# Patient Record
Sex: FEMALE | Race: WHITE | NOT HISPANIC OR LATINO
[De-identification: names, ages, dates, MRNs, and addresses within clinical notes are randomized per-mention and may not be internally consistent; named-entity substitution may affect disease eponyms.]

---

## 2021-12-27 ENCOUNTER — NON-APPOINTMENT (OUTPATIENT)
Age: 26
End: 2021-12-27

## 2021-12-27 ENCOUNTER — APPOINTMENT (OUTPATIENT)
Dept: INTERNAL MEDICINE | Facility: CLINIC | Age: 26
End: 2021-12-27
Payer: MEDICAID

## 2021-12-27 VITALS
RESPIRATION RATE: 16 BRPM | TEMPERATURE: 98.2 F | WEIGHT: 124 LBS | OXYGEN SATURATION: 99 % | HEART RATE: 101 BPM | DIASTOLIC BLOOD PRESSURE: 73 MMHG | SYSTOLIC BLOOD PRESSURE: 108 MMHG | HEIGHT: 63 IN | BODY MASS INDEX: 21.97 KG/M2

## 2021-12-27 PROBLEM — Z00.00 ENCOUNTER FOR PREVENTIVE HEALTH EXAMINATION: Status: ACTIVE | Noted: 2021-12-27

## 2021-12-27 PROCEDURE — 99395 PREV VISIT EST AGE 18-39: CPT | Mod: 25

## 2021-12-27 PROCEDURE — 93000 ELECTROCARDIOGRAM COMPLETE: CPT

## 2021-12-27 NOTE — PHYSICAL EXAM
[Normal] : normal gait, coordination grossly intact, no focal deficits [de-identified] : implants, no mass palpable

## 2021-12-27 NOTE — HISTORY OF PRESENT ILLNESS
[de-identified] : Patient comes for a physical.  No changes in functional ability.  No new complaints.  Has not been seen by GYN.

## 2021-12-28 ENCOUNTER — TRANSCRIPTION ENCOUNTER (OUTPATIENT)
Age: 26
End: 2021-12-28

## 2021-12-28 LAB
ALBUMIN SERPL ELPH-MCNC: 5 G/DL
ALP BLD-CCNC: 61 U/L
ALT SERPL-CCNC: 12 U/L
ANION GAP SERPL CALC-SCNC: 11 MMOL/L
AST SERPL-CCNC: 15 U/L
BASOPHILS # BLD AUTO: 0.06 K/UL
BASOPHILS NFR BLD AUTO: 0.6 %
BILIRUB SERPL-MCNC: 0.4 MG/DL
BUN SERPL-MCNC: 12 MG/DL
CALCIUM SERPL-MCNC: 9.7 MG/DL
CHLORIDE SERPL-SCNC: 103 MMOL/L
CHOLEST SERPL-MCNC: 195 MG/DL
CO2 SERPL-SCNC: 28 MMOL/L
CREAT SERPL-MCNC: 0.62 MG/DL
EOSINOPHIL # BLD AUTO: 0.18 K/UL
EOSINOPHIL NFR BLD AUTO: 1.7 %
ESTIMATED AVERAGE GLUCOSE: 97 MG/DL
GLUCOSE SERPL-MCNC: 95 MG/DL
HBA1C MFR BLD HPLC: 5 %
HCT VFR BLD CALC: 42.6 %
HDLC SERPL-MCNC: 94 MG/DL
HGB BLD-MCNC: 13.7 G/DL
IMM GRANULOCYTES NFR BLD AUTO: 0.5 %
LDLC SERPL CALC-MCNC: 86 MG/DL
LYMPHOCYTES # BLD AUTO: 1.2 K/UL
LYMPHOCYTES NFR BLD AUTO: 11.5 %
MAN DIFF?: NORMAL
MCHC RBC-ENTMCNC: 29.5 PG
MCHC RBC-ENTMCNC: 32.2 GM/DL
MCV RBC AUTO: 91.6 FL
MONOCYTES # BLD AUTO: 0.64 K/UL
MONOCYTES NFR BLD AUTO: 6.2 %
NEUTROPHILS # BLD AUTO: 8.27 K/UL
NEUTROPHILS NFR BLD AUTO: 79.5 %
NONHDLC SERPL-MCNC: 101 MG/DL
PLATELET # BLD AUTO: 367 K/UL
POTASSIUM SERPL-SCNC: 4.3 MMOL/L
PROT SERPL-MCNC: 7.1 G/DL
RBC # BLD: 4.65 M/UL
RBC # FLD: 12.7 %
SODIUM SERPL-SCNC: 142 MMOL/L
TRIGL SERPL-MCNC: 78 MG/DL
TSH SERPL-ACNC: 0.75 UIU/ML
WBC # FLD AUTO: 10.4 K/UL

## 2022-06-23 ENCOUNTER — EMERGENCY (EMERGENCY)
Facility: HOSPITAL | Age: 27
LOS: 1 days | Discharge: HOME | End: 2022-06-23
Attending: EMERGENCY MEDICINE
Payer: MEDICAID

## 2022-06-23 VITALS
SYSTOLIC BLOOD PRESSURE: 98 MMHG | TEMPERATURE: 98 F | HEART RATE: 62 BPM | HEIGHT: 63 IN | RESPIRATION RATE: 18 BRPM | DIASTOLIC BLOOD PRESSURE: 55 MMHG | WEIGHT: 119.93 LBS

## 2022-06-23 VITALS
RESPIRATION RATE: 18 BRPM | TEMPERATURE: 98 F | OXYGEN SATURATION: 100 % | HEART RATE: 62 BPM | DIASTOLIC BLOOD PRESSURE: 56 MMHG | SYSTOLIC BLOOD PRESSURE: 101 MMHG

## 2022-06-23 LAB
APPEARANCE UR: ABNORMAL
BACTERIA # UR AUTO: NEGATIVE — SIGNIFICANT CHANGE UP
BILIRUB UR-MCNC: NEGATIVE — SIGNIFICANT CHANGE UP
COLOR SPEC: ABNORMAL
DIFF PNL FLD: ABNORMAL
EPI CELLS # UR: 5 — SIGNIFICANT CHANGE UP
GLUCOSE UR QL: NEGATIVE — SIGNIFICANT CHANGE UP
HCG UR QL: NEGATIVE — SIGNIFICANT CHANGE UP
HYALINE CASTS # UR AUTO: 0 /LPF — SIGNIFICANT CHANGE UP (ref 0–2)
KETONES UR-MCNC: NEGATIVE — SIGNIFICANT CHANGE UP
LEUKOCYTE ESTERASE UR-ACNC: ABNORMAL
NITRITE UR-MCNC: NEGATIVE — SIGNIFICANT CHANGE UP
PH UR: 6 — SIGNIFICANT CHANGE UP (ref 5–8)
PROT UR-MCNC: ABNORMAL
RBC CASTS # UR COMP ASSIST: >50 /HPF — HIGH (ref 0–4)
SP GR SPEC: 1.01 — SIGNIFICANT CHANGE UP (ref 1.01–1.02)
UROBILINOGEN FLD QL: NEGATIVE — SIGNIFICANT CHANGE UP
WBC UR QL: 35 /HPF — HIGH (ref 0–5)

## 2022-06-23 PROCEDURE — 96374 THER/PROPH/DIAG INJ IV PUSH: CPT

## 2022-06-23 PROCEDURE — 81025 URINE PREGNANCY TEST: CPT

## 2022-06-23 PROCEDURE — 87086 URINE CULTURE/COLONY COUNT: CPT

## 2022-06-23 PROCEDURE — 99284 EMERGENCY DEPT VISIT MOD MDM: CPT | Mod: 25

## 2022-06-23 PROCEDURE — 87186 SC STD MICRODIL/AGAR DIL: CPT

## 2022-06-23 PROCEDURE — 81001 URINALYSIS AUTO W/SCOPE: CPT

## 2022-06-23 PROCEDURE — 99284 EMERGENCY DEPT VISIT MOD MDM: CPT

## 2022-06-23 RX ORDER — CEFPODOXIME PROXETIL 100 MG
1 TABLET ORAL
Qty: 14 | Refills: 0
Start: 2022-06-23 | End: 2022-06-29

## 2022-06-23 RX ORDER — CEFTRIAXONE 500 MG/1
1000 INJECTION, POWDER, FOR SOLUTION INTRAMUSCULAR; INTRAVENOUS ONCE
Refills: 0 | Status: COMPLETED | OUTPATIENT
Start: 2022-06-23 | End: 2022-06-23

## 2022-06-23 RX ADMIN — CEFTRIAXONE 100 MILLIGRAM(S): 500 INJECTION, POWDER, FOR SOLUTION INTRAMUSCULAR; INTRAVENOUS at 08:48

## 2022-06-23 NOTE — ED PROVIDER NOTE - PHYSICAL EXAMINATION
CONSTITUTIONAL: Patient is awake, alert and oriented x 3. Patient is well appearing and in no acute distress  HEAD: NCAT  NECK: supple, FROM  LUNGS: CTA B/L, no wheezing or rales   HEART: RRR.+S1S2 no murmurs  ABDOMEN: Soft nd/nttp, no rebound or guarding  EXTREMITY: no edema or calf tenderness b/l, FROM upper and lower ext b/l  SKIN: with no rash or lesions  NEURO: No focal deficits

## 2022-06-23 NOTE — ED PROVIDER NOTE - PROGRESS NOTE DETAILS
Attending Karthikeyan:  pt's bp at bs was 107/80, no indication for labs/fluids at this time if ua comes back positive

## 2022-06-23 NOTE — ED PROVIDER NOTE - OBJECTIVE STATEMENT
26 year old female with no sig pmhx presents to ED c/o urinary frequency and urgency with sensation that she is not fully emptying her bladder. Pt reports onset of symptoms middle of the night. She describes that she couldn't sleep because she kept having to go to the bathroom but when she would urinate only a few drops would come out. She reports she has never had similar in the past. Last time she urinated she reports  seeing blood in her urine. LMP 6/8. Pt states she saw her GYN a few weeks ago for routine visit and everything was normal. Denies vaginal discharge or concern for STI. Denies fevers, chills, chest pain, sob, abd pain, n/v/d, dysuria

## 2022-06-23 NOTE — ED PROVIDER NOTE - ADDITIONAL NOTES AND INSTRUCTIONS:
Please excuse from work/school as he/she was being evaluated in the Emergency Room at [facility].

## 2022-06-23 NOTE — ED PROVIDER NOTE - NSFOLLOWUPINSTRUCTIONS_ED_ALL_ED_FT
-You were seen in the Emergency Department (ED) for urinary urgency. Lab and imaging results, if performed, were discussed with you along with your discharge diagnosis.    FOLLOW-UP:  -Please follow up with your PMD if symptoms return or for any concerning matter pertaining to your urinary urgency.  -Please follow up with your private physician within the next 72 hours. Tell them you were recently in the ED for an urgent issue and would like to be seen. Bring copies of your results if you were given.   -If you do not have a PMD, please call [phone] to find one convenient for you or call our clinic at [phone].    MEDICATIONS:  -Continue all other prescribed medicine, IF ANY, as per your primary care doctor's (PMD) recommendations.    PAIN CONTROL:  -Please take over the counter Tylenol (also known as acetaminophen) 650mg every 6 hours or Ibuprofen (also known as motrin, advil) 600mg every 8 hour for your pain, IF ANY, unless you are not supposed to for any reason.  -Rest, stay hydrated with plenty of fluids (drink at least 2 Liters or 64 Ounces of water each day UNLESS you are supposed to restrict fluids or ANY reason.    RETURN PRECAUTIONS:  -Please return to the Emergency Department if you experience ANY new or concerning symptoms, such as, but not limited to: worsening pain, large amount of bleeding, passing out, fever >100.F, shaking chills, inability to see or new double vision, chest pain, difficulty breathing, diffuse abdominal pain, unable to eat or drink, continuous vomiting or diarrhea, unable to move or feel part of your body

## 2022-06-23 NOTE — ED ADULT NURSE NOTE - NSIMPLEMENTINTERV_GEN_ALL_ED
Implemented All Universal Safety Interventions:  [place] to call system. Call bell, personal items and telephone within reach. Instruct patient to call for assistance. Room bathroom lighting operational. Non-slip footwear when patient is off stretcher. Physically safe environment: no spills, clutter or unnecessary equipment. Stretcher in lowest position, wheels locked, appropriate side rails in place.

## 2022-06-23 NOTE — ED ADULT NURSE NOTE - OBJECTIVE STATEMENT
27y/o female A&Ox3 speaking coherently independent denies pmh p/w urinary symptoms since early this morning. States discomfort woke her up from sleep. Endorses feelings of incomplete bladder emptying/dysuria, hematuria, lower abdominal and bladder pressure. Denies use of OCP's, LMP 6/8. Denies hx UTI's. Did not take anything for pain. Denies fever/chills/N/V/D. Does not appear to be in any acute distress. Safety and comfort measures provided. Bed locked and in lowest position, side rails up for safety. Call bell within reach. 25y/o female A&Ox3 speaking coherently independent denies pmh p/w urinary symptoms since early this morning. States discomfort woke her up from sleep. Endorses feelings of incomplete bladder emptying/dysuria, frequency, hematuria, lower abdominal and bladder pressure. Denies use of OCP's, LMP 6/8. Denies hx UTI's. Did not take anything for pain. Denies fever/chills/N/V/D. Does not appear to be in any acute distress. Safety and comfort measures provided. Bed locked and in lowest position, side rails up for safety. Call bell within reach. 25y/o female A&Ox3 speaking coherently independent denies pmh p/w urinary symptoms since early this morning. States discomfort woke her up from sleep. Endorses feelings of incomplete bladder emptying/dysuria, frequency, burning on urination, hematuria, lower abdominal and bladder pressure. Denies use of OCP's, LMP 6/8. Denies hx UTI's. Did not take anything for pain. Denies fever/chills/N/V/D. Does not appear to be in any acute distress. Safety and comfort measures provided. Bed locked and in lowest position, side rails up for safety. Call bell within reach.

## 2022-06-23 NOTE — ED PROVIDER NOTE - CLINICAL SUMMARY MEDICAL DECISION MAKING FREE TEXT BOX
Attending Karthikeyan:  26 year old female with no sig pmhx presents to ED c/o urinary urgency, and sensation of difficulty emptying bladder, pos chills, pos nausea, no back pain, afebrile vitals stable  non toxic well appearing, NC/AT,  conjunctiva non conjected, sclera anicteric, moist mucous membranes, neck supple, heart sounds, normal, no mrg, lungs cta b/l no wrr, abd soft pos sp tenderness, neg rlq llq ttp, no visual deformities of extremities, axox3, , normal mood and affect, denies vaginal itching, discharge, sounds consistent w/ uti, plan to check urine, if pos will trx dc home. If neg, re evaluate.

## 2022-06-23 NOTE — ED PROVIDER NOTE - NS ED ATTENDING STATEMENT MOD
This was a shared visit with the ART. I reviewed and verified the documentation and independently performed the documented:

## 2022-06-23 NOTE — ED PROVIDER NOTE - PATIENT PORTAL LINK FT
You can access the FollowMyHealth Patient Portal offered by [facility] [place] by registering at the following website: [email or web address]. By joining [facility]’s FollowMyHealth portal, you will also be able to view your health information using other applications (apps) compatible with our system.

## 2022-12-27 ENCOUNTER — APPOINTMENT (OUTPATIENT)
Dept: INTERNAL MEDICINE | Facility: CLINIC | Age: 27
End: 2022-12-27

## 2023-01-10 DIAGNOSIS — Z78.9 OTHER SPECIFIED HEALTH STATUS: ICD-10-CM

## 2023-01-10 DIAGNOSIS — Z86.69 PERSONAL HISTORY OF OTHER DISEASES OF THE NERVOUS SYSTEM AND SENSE ORGANS: ICD-10-CM

## 2023-01-10 DIAGNOSIS — Z87.898 PERSONAL HISTORY OF OTHER SPECIFIED CONDITIONS: ICD-10-CM

## 2023-01-12 ENCOUNTER — APPOINTMENT (OUTPATIENT)
Dept: INTERNAL MEDICINE | Facility: CLINIC | Age: 28
End: 2023-01-12

## 2023-01-17 ENCOUNTER — APPOINTMENT (OUTPATIENT)
Dept: INTERNAL MEDICINE | Facility: CLINIC | Age: 28
End: 2023-01-17
Payer: MEDICAID

## 2023-01-17 ENCOUNTER — NON-APPOINTMENT (OUTPATIENT)
Age: 28
End: 2023-01-17

## 2023-01-17 VITALS
HEART RATE: 78 BPM | HEIGHT: 63 IN | BODY MASS INDEX: 21.62 KG/M2 | RESPIRATION RATE: 18 BRPM | DIASTOLIC BLOOD PRESSURE: 74 MMHG | SYSTOLIC BLOOD PRESSURE: 110 MMHG | OXYGEN SATURATION: 99 % | WEIGHT: 122 LBS

## 2023-01-17 PROCEDURE — G0444 DEPRESSION SCREEN ANNUAL: CPT | Mod: 59

## 2023-01-17 PROCEDURE — 93000 ELECTROCARDIOGRAM COMPLETE: CPT | Mod: 59

## 2023-01-17 PROCEDURE — 99395 PREV VISIT EST AGE 18-39: CPT | Mod: 25

## 2023-01-17 NOTE — HISTORY OF PRESENT ILLNESS
[de-identified] : Patient comes for physical.  Overall doing well with no changes in functional or exertional ability.  Recently got engaged.  2 months pregnant.  Blood work has been done and results pending\par No acute complaints.

## 2023-01-17 NOTE — HEALTH RISK ASSESSMENT
[Never] : Never [No] : In the past 12 months have you used drugs other than those required for medical reasons? No [0] : 2) Feeling down, depressed, or hopeless: Not at all (0) [PHQ-2 Negative - No further assessment needed] : PHQ-2 Negative - No further assessment needed [No falls in past year] : Patient reported no falls in the past year [Audit-CScore] : 0 [[id number]] : 0 [HIV test declined] : HIV test declined [Hepatitis C test declined] : Hepatitis C test declined [Fully functional (bathing, dressing, toileting, transferring, walking, feeding)] : Fully functional (bathing, dressing, toileting, transferring, walking, feeding) [Fully functional (using the telephone, shopping, preparing meals, housekeeping, doing laundry, using] : Fully functional and needs no help or supervision to perform IADLs (using the telephone, shopping, preparing meals, housekeeping, doing laundry, using transportation, managing medications and managing finances) [Patient/Caregiver unclear of wishes] : , patient/caregiver unclear of wishes [I will adhere to the patient's wishes.] : I will adhere to the patient's wishes. [AdvancecareDate] : 1/17/23

## 2023-01-19 LAB
ALBUMIN SERPL ELPH-MCNC: 4.8 G/DL
ALP BLD-CCNC: 50 U/L
ALT SERPL-CCNC: 12 U/L
ANION GAP SERPL CALC-SCNC: 12 MMOL/L
AST SERPL-CCNC: 14 U/L
BASOPHILS # BLD AUTO: 0.04 K/UL
BASOPHILS NFR BLD AUTO: 0.4 %
BILIRUB SERPL-MCNC: 0.3 MG/DL
BUN SERPL-MCNC: 10 MG/DL
CALCIUM SERPL-MCNC: 9.5 MG/DL
CHLORIDE SERPL-SCNC: 101 MMOL/L
CO2 SERPL-SCNC: 24 MMOL/L
CREAT SERPL-MCNC: 0.53 MG/DL
EGFR: 129 ML/MIN/1.73M2
EOSINOPHIL # BLD AUTO: 0.14 K/UL
EOSINOPHIL NFR BLD AUTO: 1.4 %
ESTIMATED AVERAGE GLUCOSE: 103 MG/DL
GLUCOSE SERPL-MCNC: 79 MG/DL
HBA1C MFR BLD HPLC: 5.2 %
HCT VFR BLD CALC: 38.3 %
HGB BLD-MCNC: 12.7 G/DL
IMM GRANULOCYTES NFR BLD AUTO: 0.3 %
LYMPHOCYTES # BLD AUTO: 1.67 K/UL
LYMPHOCYTES NFR BLD AUTO: 17.2 %
MAN DIFF?: NORMAL
MCHC RBC-ENTMCNC: 29.5 PG
MCHC RBC-ENTMCNC: 33.2 GM/DL
MCV RBC AUTO: 89.1 FL
MONOCYTES # BLD AUTO: 0.71 K/UL
MONOCYTES NFR BLD AUTO: 7.3 %
NEUTROPHILS # BLD AUTO: 7.1 K/UL
NEUTROPHILS NFR BLD AUTO: 73.4 %
PLATELET # BLD AUTO: 288 K/UL
POTASSIUM SERPL-SCNC: 4.5 MMOL/L
PROT SERPL-MCNC: 7.3 G/DL
RBC # BLD: 4.3 M/UL
RBC # FLD: 12.6 %
SODIUM SERPL-SCNC: 138 MMOL/L
TSH SERPL-ACNC: 1.45 UIU/ML
WBC # FLD AUTO: 9.69 K/UL

## 2023-04-03 ENCOUNTER — OUTPATIENT (OUTPATIENT)
Dept: OUTPATIENT SERVICES | Facility: HOSPITAL | Age: 28
LOS: 1 days | End: 2023-04-03
Payer: MEDICAID

## 2023-04-03 VITALS
DIASTOLIC BLOOD PRESSURE: 69 MMHG | HEART RATE: 67 BPM | OXYGEN SATURATION: 100 % | SYSTOLIC BLOOD PRESSURE: 102 MMHG | RESPIRATION RATE: 18 BRPM | TEMPERATURE: 98 F

## 2023-04-03 VITALS — DIASTOLIC BLOOD PRESSURE: 53 MMHG | SYSTOLIC BLOOD PRESSURE: 104 MMHG | HEART RATE: 64 BPM

## 2023-04-03 DIAGNOSIS — D24.1 BENIGN NEOPLASM OF RIGHT BREAST: Chronic | ICD-10-CM

## 2023-04-03 DIAGNOSIS — O26.899 OTHER SPECIFIED PREGNANCY RELATED CONDITIONS, UNSPECIFIED TRIMESTER: ICD-10-CM

## 2023-04-03 LAB
APPEARANCE UR: ABNORMAL
BACTERIA # UR AUTO: ABNORMAL
BILIRUB UR-MCNC: NEGATIVE — SIGNIFICANT CHANGE UP
COLOR SPEC: YELLOW — SIGNIFICANT CHANGE UP
DIFF PNL FLD: NEGATIVE — SIGNIFICANT CHANGE UP
EPI CELLS # UR: 6 /HPF — HIGH
GLUCOSE UR QL: NEGATIVE — SIGNIFICANT CHANGE UP
HYALINE CASTS # UR AUTO: 4 /LPF — HIGH (ref 0–2)
KETONES UR-MCNC: NEGATIVE — SIGNIFICANT CHANGE UP
LEUKOCYTE ESTERASE UR-ACNC: ABNORMAL
NITRITE UR-MCNC: NEGATIVE — SIGNIFICANT CHANGE UP
PH UR: 6 — SIGNIFICANT CHANGE UP (ref 5–8)
PROT UR-MCNC: ABNORMAL
RBC CASTS # UR COMP ASSIST: 2 /HPF — SIGNIFICANT CHANGE UP (ref 0–4)
SP GR SPEC: 1.02 — SIGNIFICANT CHANGE UP (ref 1.01–1.02)
UROBILINOGEN FLD QL: NEGATIVE — SIGNIFICANT CHANGE UP
WBC UR QL: 7 /HPF — HIGH (ref 0–5)

## 2023-04-03 PROCEDURE — 81001 URINALYSIS AUTO W/SCOPE: CPT

## 2023-04-03 PROCEDURE — G0463: CPT

## 2023-04-03 PROCEDURE — 99213 OFFICE O/P EST LOW 20 MIN: CPT | Mod: GC,25

## 2023-04-03 NOTE — OB RN TRIAGE NOTE - PRETERM DELIVERIES, OB PROFILE
Patient request treatment for STD for exposure to Chlamydia. He also requesting referral for PCP and states he has history of Myastthenia gravis. [place] .(He states he was dx with it in [place] where he use to live). 0

## 2023-04-03 NOTE — OB PROVIDER TRIAGE NOTE - NSHPPHYSICALEXAM_GEN_ALL_CORE
Vital Signs Last 24 Hrs  T(C): 36.7 (03 Apr 2023 21:32), Max: 36.7 (03 Apr 2023 21:25)  T(F): 98.06 (03 Apr 2023 21:32), Max: 98.1 (03 Apr 2023 21:25)  HR: 61 (03 Apr 2023 22:46) (56 - 81)  BP: 102/69 (03 Apr 2023 21:32) (95/61 - 102/69)  BP(mean): --  RR: 18 (03 Apr 2023 21:32) (18 - 18)  SpO2: 99% (03 Apr 2023 22:46) (89% - 100%)    Parameters below as of 03 Apr 2023 21:25  Patient On (Oxygen Delivery Method): room air    Gen: NAD  Abd: Soft, nontender, gravid  Back: No CVAT  Ext: No edema    SSE: cervix closed, physiologic discharge    TVUS: CL 3.2cm  TAUS: vtx, fundal, MVP 4.3cm, +FM, +

## 2023-04-03 NOTE — OB PROVIDER TRIAGE NOTE - NSOBPROVIDERNOTE_OBGYN_ALL_OB_FT
26 y/o  @18w6d with uncomplicated PNC presenting with sharp shooting LLQ pain, now resolved. No evidence of PTL (CL 3.2cm). No other localizing sx. VSS. Fetal status reassuring.   - UA   - monitor for recurrence of sx    D/w Dr. Vida Avrey, PGY-3 26 y/o  @18w6d with uncomplicated PNC presenting with sharp shooting LLQ pain, now resolved. No evidence of PTL (CL 3.2cm). No other localizing sx. VSS. Fetal status reassuring.   - UA   - monitor for recurrence of sx    D/w Dr. Vida Avery, PGY-3      ATTG note    Read and agree with above PGY3 note    28 yo P0 @ 18.6 wks presents for LLQ pain that has since resolved.  Denies ctxs/cramping/LOF.  Denies any other sxs.  Pt presented to r/o signs of Labor.  VSS  VE-closed  CL-3.2 cm, no dynamic changes  BSUS-+SIUP, +FH    U/A-inconclusive, ucx sent    P0 @ 18.6 wks for r/o PTL with LLQ pain.  NO signs of cervical change or early labor.  CLinically stable to dc to home.  -Stable to dc to home  -f/u with routine prenatal visit  -Return precautions    JUANITO Mcpherson 26 y/o  @18w6d with uncomplicated PNC presenting with sharp shooting LLQ pain, now resolved. No evidence of PTL (CL 3.2cm). No other localizing sx. VSS. Fetal status reassuring.   - UA   - monitor for recurrence of sx    D/w Dr. Vida Avery, PGY-3    Addendum:  - pt continues to be asymptomatic  - UA contaminated, will send Ucx  - Phone number for faculty practice provided  - DC home with precautions    D/w Dr. Vida Avery, PGY-3     ATTG note    Read and agree with above PGY3 note    28 yo P0 @ 18.6 wks presents for LLQ pain that has since resolved.  Denies ctxs/cramping/LOF.  Denies any other sxs.  Pt presented to r/o signs of Labor.  VSS  VE-closed  CL-3.2 cm, no dynamic changes  BSUS-+SIUP, +FH    U/A-inconclusive, ucx sent    P0 @ 18.6 wks for r/o PTL with LLQ pain.  NO signs of cervical change or early labor.  CLinically stable to dc to home.  -Stable to dc to home  -f/u with routine prenatal visit  -Return precautions    JUANITO Mcpherson

## 2023-04-05 DIAGNOSIS — R10.32 LEFT LOWER QUADRANT PAIN: ICD-10-CM

## 2023-04-05 DIAGNOSIS — O26.892 OTHER SPECIFIED PREGNANCY RELATED CONDITIONS, SECOND TRIMESTER: ICD-10-CM

## 2023-04-05 DIAGNOSIS — G43.909 MIGRAINE, UNSPECIFIED, NOT INTRACTABLE, WITHOUT STATUS MIGRAINOSUS: ICD-10-CM

## 2023-04-05 DIAGNOSIS — Z3A.18 18 WEEKS GESTATION OF PREGNANCY: ICD-10-CM

## 2023-04-05 DIAGNOSIS — O99.352 DISEASES OF THE NERVOUS SYSTEM COMPLICATING PREGNANCY, SECOND TRIMESTER: ICD-10-CM

## 2023-08-23 ENCOUNTER — INPATIENT (INPATIENT)
Facility: HOSPITAL | Age: 28
LOS: 2 days | Discharge: HOME | End: 2023-08-26
Attending: OBSTETRICS & GYNECOLOGY | Admitting: OBSTETRICS & GYNECOLOGY
Payer: MEDICAID

## 2023-08-23 VITALS
SYSTOLIC BLOOD PRESSURE: 119 MMHG | HEART RATE: 100 BPM | DIASTOLIC BLOOD PRESSURE: 73 MMHG | RESPIRATION RATE: 17 BRPM | TEMPERATURE: 99 F

## 2023-08-23 DIAGNOSIS — O26.899 OTHER SPECIFIED PREGNANCY RELATED CONDITIONS, UNSPECIFIED TRIMESTER: ICD-10-CM

## 2023-08-23 DIAGNOSIS — D24.1 BENIGN NEOPLASM OF RIGHT BREAST: Chronic | ICD-10-CM

## 2023-08-23 DIAGNOSIS — Z34.80 ENCOUNTER FOR SUPERVISION OF OTHER NORMAL PREGNANCY, UNSPECIFIED TRIMESTER: ICD-10-CM

## 2023-08-23 LAB
BASOPHILS # BLD AUTO: 0.12 K/UL — SIGNIFICANT CHANGE UP (ref 0–0.2)
BASOPHILS NFR BLD AUTO: 0.9 % — SIGNIFICANT CHANGE UP (ref 0–2)
BLD GP AB SCN SERPL QL: NEGATIVE — SIGNIFICANT CHANGE UP
EOSINOPHIL # BLD AUTO: 0.12 K/UL — SIGNIFICANT CHANGE UP (ref 0–0.5)
EOSINOPHIL NFR BLD AUTO: 0.9 % — SIGNIFICANT CHANGE UP (ref 0–6)
HCT VFR BLD CALC: 35.1 % — SIGNIFICANT CHANGE UP (ref 34.5–45)
HGB BLD-MCNC: 11.2 G/DL — LOW (ref 11.5–15.5)
LYMPHOCYTES # BLD AUTO: 1.23 K/UL — SIGNIFICANT CHANGE UP (ref 1–3.3)
LYMPHOCYTES # BLD AUTO: 9.6 % — LOW (ref 13–44)
MANUAL SMEAR VERIFICATION: SIGNIFICANT CHANGE UP
MCHC RBC-ENTMCNC: 26.5 PG — LOW (ref 27–34)
MCHC RBC-ENTMCNC: 31.9 GM/DL — LOW (ref 32–36)
MCV RBC AUTO: 83 FL — SIGNIFICANT CHANGE UP (ref 80–100)
MONOCYTES # BLD AUTO: 1.01 K/UL — HIGH (ref 0–0.9)
MONOCYTES NFR BLD AUTO: 7.9 % — SIGNIFICANT CHANGE UP (ref 2–14)
NEUTROPHILS # BLD AUTO: 10.32 K/UL — HIGH (ref 1.8–7.4)
NEUTROPHILS NFR BLD AUTO: 80.7 % — HIGH (ref 43–77)
PLAT MORPH BLD: NORMAL — SIGNIFICANT CHANGE UP
PLATELET # BLD AUTO: 235 K/UL — SIGNIFICANT CHANGE UP (ref 150–400)
RBC # BLD: 4.23 M/UL — SIGNIFICANT CHANGE UP (ref 3.8–5.2)
RBC # FLD: 14.6 % — HIGH (ref 10.3–14.5)
RBC BLD AUTO: SIGNIFICANT CHANGE UP
RH IG SCN BLD-IMP: POSITIVE — SIGNIFICANT CHANGE UP
RH IG SCN BLD-IMP: POSITIVE — SIGNIFICANT CHANGE UP
T PALLIDUM AB TITR SER: NEGATIVE — SIGNIFICANT CHANGE UP
WBC # BLD: 12.79 K/UL — HIGH (ref 3.8–10.5)
WBC # FLD AUTO: 12.79 K/UL — HIGH (ref 3.8–10.5)

## 2023-08-23 RX ORDER — OXYTOCIN 10 UNIT/ML
2 VIAL (ML) INJECTION
Qty: 30 | Refills: 0 | Status: DISCONTINUED | OUTPATIENT
Start: 2023-08-23 | End: 2023-08-26

## 2023-08-23 RX ORDER — SODIUM CHLORIDE 9 MG/ML
1000 INJECTION, SOLUTION INTRAVENOUS
Refills: 0 | Status: DISCONTINUED | OUTPATIENT
Start: 2023-08-23 | End: 2023-08-24

## 2023-08-23 RX ORDER — CHLORHEXIDINE GLUCONATE 213 G/1000ML
1 SOLUTION TOPICAL DAILY
Refills: 0 | Status: DISCONTINUED | OUTPATIENT
Start: 2023-08-23 | End: 2023-08-24

## 2023-08-23 RX ORDER — OXYTOCIN 10 UNIT/ML
333.33 VIAL (ML) INJECTION
Qty: 20 | Refills: 0 | Status: DISCONTINUED | OUTPATIENT
Start: 2023-08-23 | End: 2023-08-24

## 2023-08-23 RX ORDER — CITRIC ACID/SODIUM CITRATE 300-500 MG
15 SOLUTION, ORAL ORAL EVERY 6 HOURS
Refills: 0 | Status: DISCONTINUED | OUTPATIENT
Start: 2023-08-23 | End: 2023-08-24

## 2023-08-23 RX ADMIN — Medication 2 MILLIUNIT(S)/MIN: at 19:14

## 2023-08-23 RX ADMIN — SODIUM CHLORIDE 125 MILLILITER(S): 9 INJECTION, SOLUTION INTRAVENOUS at 13:45

## 2023-08-23 NOTE — CHART NOTE - NSCHARTNOTEFT_GEN_A_CORE
ATTG on service note    Pt interviewed at the bedside.  Hospital course reviewed.    Pt is a 27 yo P0 @ 39.1 wks admitted today for ROM since 5:30am @ 2/70/-3.  Pt given buccal cytotec x 1 and then started on Pitocin.  Pt given epidural for pain mngt.      T(C): 36.8 (23 @ 18:12), Max: 37.1 (23 @ 10:02)  HR: 82 (23 @ 21:42) (39 - 107)  BP: 93/47 (23 @ 21:42) (78/42 - 153/71)  RR: 20 (23 @ 18:12) (17 - 20)  SpO2: 98% (23 @ 21:42) (78% - 100%)    VE-4/80/-2  FHT-baseline 145, moderate variability, no decels, +accels  toco-q 2-3 min  EFW-3200 gms   GBS neg    Labs   CBC-12.7/11.2/35.1/235  MBT-A+      a/p:27 yo P0 @ 39.1 wks with PROM started on IOL.  s/p Buccal cytotec x 1 now on Pitocin.  Pt had prolonged decel after initiating Pitocin and noted to have tachysystole.  Intrauterine resuscitation performed - terb given and Pitocin held - and FHT improved.  Cat 1 FHT since then. PNC in [facility] and relatively uncomplicated as per pt.  GBS neg.  -Pitocin resumed after Cat 1 FHT noted   -cont to monitor FHT  -cont epidural for pain mngt  -GBS neg  -low PPH risk at this time, accepts blood   -SCD for DVT ppx  -girl fetus  -cont to  on contraception prior to dc  -anticipate NADIR Mcpherson

## 2023-08-23 NOTE — OB PROVIDER IHI INDUCTION/AUGMENTATION NOTE - NS_CHECKALL_OBGYN_ALL_OB
Order was written/H&P was completed/Contractions pattern was reviewed/FHR was reviewed/Induction / Augmentation was discussed
DISCHARGE

## 2023-08-23 NOTE — OB RN TRIAGE NOTE - FALL HARM RISK - UNIVERSAL INTERVENTIONS
Bed in lowest position, wheels locked, appropriate side rails in place/Call bell, personal items and telephone in reach/Instruct patient to call for assistance before getting out of bed or chair/Non-slip footwear when patient is out of bed/[place] to call system/Physically safe environment - no spills, clutter or unnecessary equipment/Purposeful Proactive Rounding/Room/bathroom lighting operational, light cord in reach

## 2023-08-23 NOTE — OB PROVIDER TRIAGE NOTE - HISTORY OF PRESENT ILLNESS
27yo  @39 weeks & 1 day presenting with increased leakage of fluid since 5:30am. Patient admits to good fetal movement and denies contractions or vaginal bleeding at this time.   GBS: Unknown    EFW: 3203  OB: Denies previous history   GYN: Fibroadenoma of the R breast. Denies abnormal pap smears, STDs, or ovarian cysts .   Allergies: Latex   Medical Hx: Denies history of HTN, DM, bleeding disorders, thyroid disorders   Medications: Prenatal vitamins   Surgical Hx: Fibroadenoma of the R breast,     Social Hx: Denies x3, no anxiety or depression

## 2023-08-23 NOTE — OB PROVIDER H&P - NSLOWPPHRISK_OBGYN_A_OB
No previous uterine incision/Bird Pregnancy/Less than or equal to 4 previous vaginal births/No known bleeding disorder/No history of postpartum hemorrhage/No other PPH risks indicated

## 2023-08-23 NOTE — OB PROVIDER H&P - HISTORY OF PRESENT ILLNESS
29yo  @39 weeks & 1 day presenting with increased leakage of fluid since 5:30am. Patient admits to good fetal movement and denies contractions or vaginal bleeding at this time.   GBS: Unknown    EFW: 3203  OB: Denies previous history   GYN: Fibroadenoma of the R breast. Denies abnormal pap smears, STDs, or ovarian cysts .   Allergies: Latex (rash)   Medical Hx: Denies history of HTN, DM, bleeding disorders, thyroid disorders   Medications: Prenatal vitamins   Surgical Hx: Fibroadenoma of the R breast,     Social Hx: Denies x3, no anxiety or depression  27yo  @39 weeks & 1 day presenting with increased leakage of fluid since 5:30am. Patient admits to good fetal movement and denies contractions or vaginal bleeding at this time.     GBS: Unknown    EFW: 3203  OB: Denies previous history   GYN: Fibroadenoma of the R breast. Denies abnormal pap smears, STDs, or ovarian cysts .   Allergies: Latex (rash)   Medical Hx: Denies history of HTN, DM, bleeding disorders, thyroid disorders   Medications: Prenatal vitamins   Surgical Hx: Fibroadenoma of the R breast,     Social Hx: Denies x3, no anxiety or depression  29yo  @39 weeks & 1 day presenting with increased leakage of fluid since 5:30am. Patient admits to good fetal movement and denies contractions or vaginal bleeding at this time.     GBS: Negative     EFW: 3203  OB: Denies previous history   GYN: Fibroadenoma of the R breast. Denies abnormal pap smears, STDs, or ovarian cysts .   Allergies: Latex (rash)   Medical Hx: Denies history of HTN, DM, bleeding disorders, thyroid disorders   Medications: Prenatal vitamins   Surgical Hx: Fibroadenoma of the R breast,     Social Hx: Denies x3, no anxiety or depression

## 2023-08-23 NOTE — OB PROVIDER TRIAGE NOTE - NSHPPHYSICALEXAM_GEN_ALL_CORE
CV: S1,S2  Pulmonary: Clear to auscultation bilaterally    Abdomen: Gravid abdomen  Extremities: Nontender to palpation bilaterally     EFM: 135hr, moderate variability, +accelerations, -decelerations   TOCO: Irritability   SVE: .5/60/-3  BSUS: CV: S1,S2  Pulmonary: Clear to auscultation bilaterally    Abdomen: Gravid abdomen  Extremities: Nontender to palpation bilaterally     EFM: 135hr, moderate variability, +accelerations, -decelerations   TOCO: Irritability   SVE: 1/60/-3, -pooling,+nitrazine,+fern   BSUS: Vertex

## 2023-08-23 NOTE — OB PROVIDER H&P - ASSESSMENT
29yo  @39 weeks & 1 day presenting with increased leakage of fluid since 5am (LUIS 23) admitted in labor.   - Admit to L&D  - Routine labs, IVF, NPO  - EFM: 135hr, moderate variability, +accelerations, -decelerations   - GBS: Unknown   - EFW: 3203  - Augmentation of labor with buccal cytotec x1   - Discussed with Dr. Salome Buckley, PANAILA    29yo  @39 weeks & 1 day presenting with increased leakage of fluid since 5am (LUIS 23), admitted in labor.   - Admit to L&D  - Routine labs, IVF, NPO  - EFM: 135hr, moderate variability, +accelerations, -decelerations   - GBS: Unknown   - EFW: 3203  - Will continue to monitor EFM/TOCO   - Augmentation of labor with buccal cytotec x1   - Discussed with Dr. Salome Buckley, PADejanC    27yo  @39 weeks & 1 day presenting with increased leakage of fluid since 5am (LUIS 23), admitted in labor.   - Admit to L&D  - Routine labs, IVF, NPO  - EFM: 135hr, moderate variability, +accelerations, -decelerations   - GBS: Unknown   - EFW: 3203  - Will continue to monitor EFM/TOCO   - Augmentation of labor with buccal cytotec x1   - Discussed with Dr. Salome Buckley, PA-C      ATTG:  Pt seen and evaluated.  Ms. Nogueira is a 29YO P0 with care at [facility] who presents to L&D at term with PROM not in labor with occassional cntrx.  -EFW 3200  -Cytotec for cervical ripening  -Epidural PRN  -Anticipate     29yo  @39 weeks & 1 day presenting with increased leakage of fluid since 5am (LUIS 23), admitted in labor.   - Admit to L&D  - Routine labs, IVF, NPO  - EFM: 135hr, moderate variability, +accelerations, -decelerations   - GBS: Negative    - EFW: 3203  - Will continue to monitor EFM/TOCO   - Augmentation of labor with buccal cytotec x1   - Discussed with Dr. Salome Buckley, PA-C      ATTG:  Pt seen and evaluated.  Ms. Nogueira is a 29YO P0 with care at [facility] who presents to L&D at term with PROM not in labor with occassional cntrx.  -EFW 3200  -Cytotec for cervical ripening  -Epidural PRN  -Anticipate     29yo  @39 weeks & 1 day presenting with increased leakage of fluid since 5am (LUIS 23), admitted in labor.   - Admit to L&D  - Routine labs, IVF, NPO  - EFM: 135hr, moderate variability, +accelerations, -decelerations   - GBS: Negative    - EFW: 3203  - Will continue to monitor EFM/TOCO   - Augmentation of labor with buccal cytotec x1   - Anticipate    - Discussed with Dr. Salome Buckley, PADejanC      ATTG:  Pt seen and evaluated.  Ms. Nogueira is a 27YO P0 with care at [facility] who presents to L&D at term with PROM not in labor with occassional cntrx.  -EFW 3200  -Cytotec for cervical ripening  -Epidural PRN  -Anticipate

## 2023-08-23 NOTE — OB PROVIDER H&P - NSHPPHYSICALEXAM_GEN_ALL_CORE
CV: S1,S2  Pulmonary: Clear to auscultation bilaterally    Abdomen: Gravid abdomen  Extremities: Nontender to palpation bilaterally     EFM: 135hr, moderate variability, +accelerations, -decelerations   TOCO: Irregular   SVE: 2/70/-3, +pooling, +nitrazine, +fern   BSUS: Vertex CV: S1,S2  Pulmonary: Clear to auscultation bilaterally    Abdomen: Gravid abdomen  Extremities: Nontender to palpation bilaterally     EFM: 135hr, moderate variability, +accelerations, -decelerations   TOCO: Irregular   SVE: 2/70/-3, +Pooling, +Nitrazine, +Ferning    BSUS: Vertex

## 2023-08-24 PROCEDURE — 59409 OBSTETRICAL CARE: CPT | Mod: U9

## 2023-08-24 RX ORDER — KETOROLAC TROMETHAMINE 30 MG/ML
30 SYRINGE (ML) INJECTION ONCE
Refills: 0 | Status: DISCONTINUED | OUTPATIENT
Start: 2023-08-24 | End: 2023-08-24

## 2023-08-24 RX ORDER — OXYCODONE HYDROCHLORIDE 5 MG/1
5 TABLET ORAL ONCE
Refills: 0 | Status: DISCONTINUED | OUTPATIENT
Start: 2023-08-24 | End: 2023-08-26

## 2023-08-24 RX ORDER — MAGNESIUM HYDROXIDE 400 MG/1
30 TABLET, CHEWABLE ORAL
Refills: 0 | Status: DISCONTINUED | OUTPATIENT
Start: 2023-08-24 | End: 2023-08-26

## 2023-08-24 RX ORDER — OXYCODONE HYDROCHLORIDE 5 MG/1
5 TABLET ORAL
Refills: 0 | Status: DISCONTINUED | OUTPATIENT
Start: 2023-08-24 | End: 2023-08-26

## 2023-08-24 RX ORDER — DIPHENHYDRAMINE HCL 50 MG
25 CAPSULE ORAL EVERY 6 HOURS
Refills: 0 | Status: DISCONTINUED | OUTPATIENT
Start: 2023-08-24 | End: 2023-08-26

## 2023-08-24 RX ORDER — BENZOCAINE 10 %
1 GEL (GRAM) MUCOUS MEMBRANE EVERY 6 HOURS
Refills: 0 | Status: DISCONTINUED | OUTPATIENT
Start: 2023-08-24 | End: 2023-08-26

## 2023-08-24 RX ORDER — AMPICILLIN SODIUM AND SULBACTAM SODIUM 250; 125 MG/ML; MG/ML
3 INJECTION, POWDER, FOR SUSPENSION INTRAMUSCULAR; INTRAVENOUS EVERY 6 HOURS
Refills: 0 | Status: COMPLETED | OUTPATIENT
Start: 2023-08-24 | End: 2023-08-24

## 2023-08-24 RX ORDER — OXYTOCIN 10 UNIT/ML
10 VIAL (ML) INJECTION ONCE
Refills: 0 | Status: COMPLETED | OUTPATIENT
Start: 2023-08-24 | End: 2023-08-24

## 2023-08-24 RX ORDER — SIMETHICONE 80 MG/1
80 TABLET, CHEWABLE ORAL EVERY 4 HOURS
Refills: 0 | Status: DISCONTINUED | OUTPATIENT
Start: 2023-08-24 | End: 2023-08-26

## 2023-08-24 RX ORDER — PRAMOXINE HYDROCHLORIDE 150 MG/15G
1 AEROSOL, FOAM RECTAL EVERY 4 HOURS
Refills: 0 | Status: DISCONTINUED | OUTPATIENT
Start: 2023-08-24 | End: 2023-08-26

## 2023-08-24 RX ORDER — AER TRAVELER 0.5 G/1
1 SOLUTION RECTAL; TOPICAL EVERY 4 HOURS
Refills: 0 | Status: DISCONTINUED | OUTPATIENT
Start: 2023-08-24 | End: 2023-08-26

## 2023-08-24 RX ORDER — IBUPROFEN 200 MG
600 TABLET ORAL EVERY 6 HOURS
Refills: 0 | Status: COMPLETED | OUTPATIENT
Start: 2023-08-24 | End: 2024-07-22

## 2023-08-24 RX ORDER — IBUPROFEN 200 MG
600 TABLET ORAL EVERY 6 HOURS
Refills: 0 | Status: DISCONTINUED | OUTPATIENT
Start: 2023-08-24 | End: 2023-08-26

## 2023-08-24 RX ORDER — TETANUS TOXOID, REDUCED DIPHTHERIA TOXOID AND ACELLULAR PERTUSSIS VACCINE, ADSORBED 5; 2.5; 8; 8; 2.5 [IU]/.5ML; [IU]/.5ML; UG/.5ML; UG/.5ML; UG/.5ML
0.5 SUSPENSION INTRAMUSCULAR ONCE
Refills: 0 | Status: DISCONTINUED | OUTPATIENT
Start: 2023-08-24 | End: 2023-08-26

## 2023-08-24 RX ORDER — LANOLIN
1 OINTMENT (GRAM) TOPICAL EVERY 6 HOURS
Refills: 0 | Status: DISCONTINUED | OUTPATIENT
Start: 2023-08-24 | End: 2023-08-26

## 2023-08-24 RX ORDER — ONDANSETRON 8 MG/1
4 TABLET, FILM COATED ORAL ONCE
Refills: 0 | Status: COMPLETED | OUTPATIENT
Start: 2023-08-24 | End: 2023-08-24

## 2023-08-24 RX ORDER — HYDROCORTISONE 1 %
1 OINTMENT (GRAM) TOPICAL EVERY 6 HOURS
Refills: 0 | Status: DISCONTINUED | OUTPATIENT
Start: 2023-08-24 | End: 2023-08-26

## 2023-08-24 RX ORDER — OXYTOCIN 10 UNIT/ML
41.67 VIAL (ML) INJECTION
Qty: 20 | Refills: 0 | Status: DISCONTINUED | OUTPATIENT
Start: 2023-08-24 | End: 2023-08-26

## 2023-08-24 RX ORDER — DIBUCAINE 1 %
1 OINTMENT (GRAM) RECTAL EVERY 6 HOURS
Refills: 0 | Status: DISCONTINUED | OUTPATIENT
Start: 2023-08-24 | End: 2023-08-26

## 2023-08-24 RX ORDER — AMPICILLIN SODIUM AND SULBACTAM SODIUM 250; 125 MG/ML; MG/ML
INJECTION, POWDER, FOR SUSPENSION INTRAMUSCULAR; INTRAVENOUS
Refills: 0 | Status: DISCONTINUED | OUTPATIENT
Start: 2023-08-24 | End: 2023-08-24

## 2023-08-24 RX ORDER — IBUPROFEN 200 MG
600 TABLET ORAL EVERY 6 HOURS
Refills: 0 | Status: DISCONTINUED | OUTPATIENT
Start: 2023-08-24 | End: 2023-08-24

## 2023-08-24 RX ORDER — AMPICILLIN SODIUM AND SULBACTAM SODIUM 250; 125 MG/ML; MG/ML
3 INJECTION, POWDER, FOR SUSPENSION INTRAMUSCULAR; INTRAVENOUS ONCE
Refills: 0 | Status: COMPLETED | OUTPATIENT
Start: 2023-08-25 | End: 2023-08-25

## 2023-08-24 RX ORDER — AMPICILLIN SODIUM AND SULBACTAM SODIUM 250; 125 MG/ML; MG/ML
3 INJECTION, POWDER, FOR SUSPENSION INTRAMUSCULAR; INTRAVENOUS ONCE
Refills: 0 | Status: COMPLETED | OUTPATIENT
Start: 2023-08-24 | End: 2023-08-24

## 2023-08-24 RX ORDER — SODIUM CHLORIDE 9 MG/ML
1000 INJECTION, SOLUTION INTRAVENOUS ONCE
Refills: 0 | Status: COMPLETED | OUTPATIENT
Start: 2023-08-24 | End: 2023-08-24

## 2023-08-24 RX ORDER — ACETAMINOPHEN 500 MG
975 TABLET ORAL EVERY 6 HOURS
Refills: 0 | Status: DISCONTINUED | OUTPATIENT
Start: 2023-08-24 | End: 2023-08-24

## 2023-08-24 RX ORDER — SODIUM CHLORIDE 9 MG/ML
3 INJECTION INTRAMUSCULAR; INTRAVENOUS; SUBCUTANEOUS EVERY 8 HOURS
Refills: 0 | Status: DISCONTINUED | OUTPATIENT
Start: 2023-08-24 | End: 2023-08-26

## 2023-08-24 RX ORDER — ACETAMINOPHEN 500 MG
975 TABLET ORAL
Refills: 0 | Status: DISCONTINUED | OUTPATIENT
Start: 2023-08-24 | End: 2023-08-26

## 2023-08-24 RX ADMIN — Medication 10 UNIT(S): at 05:20

## 2023-08-24 RX ADMIN — AMPICILLIN SODIUM AND SULBACTAM SODIUM 200 GRAM(S): 250; 125 INJECTION, POWDER, FOR SUSPENSION INTRAMUSCULAR; INTRAVENOUS at 03:39

## 2023-08-24 RX ADMIN — Medication 0.2 MILLIGRAM(S): at 05:23

## 2023-08-24 RX ADMIN — SODIUM CHLORIDE 3 MILLILITER(S): 9 INJECTION INTRAMUSCULAR; INTRAVENOUS; SUBCUTANEOUS at 22:00

## 2023-08-24 RX ADMIN — AMPICILLIN SODIUM AND SULBACTAM SODIUM 200 GRAM(S): 250; 125 INJECTION, POWDER, FOR SUSPENSION INTRAMUSCULAR; INTRAVENOUS at 11:17

## 2023-08-24 RX ADMIN — Medication 30 MILLIGRAM(S): at 06:41

## 2023-08-24 RX ADMIN — Medication 975 MILLIGRAM(S): at 02:58

## 2023-08-24 RX ADMIN — Medication 975 MILLIGRAM(S): at 20:04

## 2023-08-24 RX ADMIN — Medication 975 MILLIGRAM(S): at 21:05

## 2023-08-24 RX ADMIN — ONDANSETRON 4 MILLIGRAM(S): 8 TABLET, FILM COATED ORAL at 06:00

## 2023-08-24 RX ADMIN — AMPICILLIN SODIUM AND SULBACTAM SODIUM 200 GRAM(S): 250; 125 INJECTION, POWDER, FOR SUSPENSION INTRAMUSCULAR; INTRAVENOUS at 23:39

## 2023-08-24 RX ADMIN — AMPICILLIN SODIUM AND SULBACTAM SODIUM 200 GRAM(S): 250; 125 INJECTION, POWDER, FOR SUSPENSION INTRAMUSCULAR; INTRAVENOUS at 17:12

## 2023-08-24 RX ADMIN — SODIUM CHLORIDE 1000 MILLILITER(S): 9 INJECTION, SOLUTION INTRAVENOUS at 02:58

## 2023-08-24 NOTE — OB PROVIDER DELIVERY SUMMARY - NSPROVIDERDELIVERYNOTE_OBGYN_ALL_OB_FT
Patient with labor course complicated by chorio with Cat 2 FHR, fetal tachycardia with decels. Patient found to be fully dilated and began pushing with contractions. Because of Cat 2 tracing preceding second stage and persisting through pushing, decision made to proceed with vacuum assisted delivery. Fetal vertex at +2 station, SIM position. MityVac applied. Head delivered with 2 pulls, 0 pop-offs. Shoulders and body delivered easily. Infant was suctioned. No mec. Cord immediately clamped and cut and infant passed to peds, present for VAVD.  Placenta delivered intact with a 3 vessel cord. Fundal massage was given with significant atony. IM pit, methergine, and cytotec VT given with improvement in uterine tone. Vaginal exam revealed an intact cervix, vaginal walls, and sulci. 1st degree laceration was repaired with 2.0 vicryl suture. Periurethral laceration repaired with 3-0 vicryl. Excellent hemostasis was noted. Patient was stable and went to recovery. Count was correct x2.    Maryjane Reese MD PGY4   w/ Dr. Mcmahon Patient with labor course complicated by chorio with Cat 2 FHR, fetal tachycardia with decels. Patient found to be fully dilated and began pushing with contractions. Because of Cat 2 tracing preceding second stage and persisting through pushing, decision made to proceed with vacuum assisted delivery. Fetal vertex at +2 station, SIM position. MityVac applied. Head delivered with 2 pulls, 0 pop-offs. Shoulders and body delivered easily. Infant was suctioned. No mec. Cord immediately clamped and cut and infant passed to peds, present for VAVD.  Placenta delivered intact with a 3 vessel cord. Fundal massage was given with significant atony. IM pit, methergine, and cytotec AR given with improvement in uterine tone. Vaginal exam revealed an intact cervix, vaginal walls, and sulci. 1st degree laceration was repaired with 2.0 vicryl suture. Periurethral laceration repaired with 3-0 vicryl. EBL 450cc. Excellent hemostasis was noted. Patient was stable and went to recovery. Count was correct x2.    Maryjane Reese MD PGY4   w/ Dr. Mcmahon

## 2023-08-24 NOTE — OB RN DELIVERY SUMMARY - NSSELHIDDEN_OBGYN_ALL_OB_FT
[[email or web address]_OB_FT:[email or web address]],[NS_DeliveryAssist1_OBGYN_ALL_OB_FT:[id number]],[NS_DeliveryRN_OBGYN_ALL_OB_FT:[id number]]

## 2023-08-24 NOTE — OB PROVIDER LABOR PROGRESS NOTE - ASSESSMENT
P0 admitted for augmentation of labor after SROM@5a, patient stable with cat 2 tracing for prolonged deceleration s/p resolution  - Pitocin paused. Will resume after 20-30 min of cat 1 tracing  - IVF bolus  - Peanut ball    Seen with Dr. Mcpherson, PGY3 Yunier, PGY4 Hugh, Dr. Salome Lehman PGY2
Pt evaluated at bedside, having more frequent, painful contractions    VE: 3.5/70/-3    Plan  - for Epi -> Pit  - EFM/TOCO    Trang Bach PGY1
28y  at 39w3 here for induction after SROM.     Plan:   - Continue EFM/Sono  - Continue pitocin@2 -> mahsa regularly   - Epidural -> Patient to use button regularly for increase pain, will re-assess if top off needed 
28y  at 39w2 here for induction after SROM.     - Continue EFM/TOCO  - Improve in BP  - Epidural for pain  - To start Pitocin     Jacque Shabazz, PGY1 
27YO P0 with Intraamniotic infection undergoing induction for PROM, on Unasyn s/p IV Tylenol.  FHR is Cat 2  Will attempt to have pt push past lip. If unable, would proceed with Abdominal delivery for non reassuring FHR tracing.

## 2023-08-24 NOTE — OB RN DELIVERY SUMMARY - NS_LABORCHARACTER_OBGYN_ALL_OB
Induction of labor-Medicinal/External electronic FM/Antibiotics in labor/Fetal intolerance/Chorioamnionitis

## 2023-08-24 NOTE — OB PROVIDER DELIVERY SUMMARY - NSLOWPPHRISK_OBGYN_A_OB
No previous uterine incision/Bird Pregnancy/Less than or equal to 4 previous vaginal births/No known bleeding disorder/No history of postpartum hemorrhage

## 2023-08-24 NOTE — OB PROVIDER DELIVERY SUMMARY - AS DELIV COMPLICATIONS OB
maternal temp/abnormal fetal heart rate tracing/chorioamnionitis/maternal fever/prolonged rupture of membranes/other/premature rupture of membranes prior to labor

## 2023-08-24 NOTE — OB RN DELIVERY SUMMARY - NS_SEPSISRSKCALC_OBGYN_ALL_OB_FT
EOS calculated successfully. EOS Risk Factor: 0.5/1000 live births ([facility] national incidence); GA=39w3d; Temp=100.4; ROM=23.733; GBS='Negative'; Antibiotics='Broad spectrum antibiotics > 4 hrs prior to birth'

## 2023-08-24 NOTE — CHART NOTE - NSCHARTNOTEFT_GEN_A_CORE
Asked to evaluate pt in PACU prior to moving to floor. Pt feeling well. +OOB. +VOID. Tolerating regular diet.  T(C): 37.7 (23 @ 07:45), Max: 39.5 (23 @ 05:47)  HR: 84 (23 @ 07:45) (39 - 152)  BP: 112/71 (23 @ 07:45) (78/42 - 174/102)  RR: 18 (23 @ 07:45) (17 - 20)  SpO2: 97% (23 @ 07:45) (77% - 100%)  Gen: NAD  CV: NRRR  Lungs: CTA  Abd: soft, non-tender, fundus firm  AP 29yo  PPD#0 VAVD 2/2 cat 2 c/b chorioamnionitis, EBL 450ml, sp methergine, sp cytotec  ok to go to floor  Adrianne Reid PAC

## 2023-08-24 NOTE — OB PROVIDER DELIVERY SUMMARY - NSSELHIDDEN_OBGYN_ALL_OB_FT
[NS_DeliveryAttending1_OBGYN_ALL_OB_FT:[email or web address]=],[NS_DeliveryAssist1_OBGYN_ALL_OB_FT:[id number]=],[NS_DeliveryRN_OBGYN_ALL_OB_FT:[id number]=]

## 2023-08-24 NOTE — OB PROVIDER LABOR PROGRESS NOTE - NS_SUBJECTIVE/OBJECTIVE_OBGYN_ALL_OB_FT
Patient seen at bedside for increase pain. Anesthesia at bedside for hypotension.
Patient seen at bedside. Currently feeling more pain w/ contraction.
Pt comfortable, epidural infusing  Vital Signs Last 24 Hrs  T(C): 38.0 (24 Aug 2023 02:41), Max: 38.0 (24 Aug 2023 02:41)  T(F): 100.4 (24 Aug 2023 02:41), Max: 100.4 (24 Aug 2023 02:41)  HR: 94 (24 Aug 2023 04:42) (39 - 107)  BP: 115/69 (24 Aug 2023 04:36) (78/42 - 174/102)  BP(mean): --  RR: 20 (23 Aug 2023 18:12) (17 - 20)  SpO2: 99% (24 Aug 2023 04:42) (78% - 100%)    Parameters below as of 23 Aug 2023 11:45  Patient On (Oxygen Delivery Method): room air
Patient seen at bedside with C attending Dr. Mcpherson for 7 minute deceleration to camilla of 85 bpm resulting from tetanic contraction. Patient recently started on pitocin.    VE 4.5/85/-2  Pitocin paused. IVF bolus running. Patient repositioned without improvement in FHR. Terb 0.25 mg IM given. FHT return to baseline.
Pt evaluated at bedside

## 2023-08-24 NOTE — OB NEONATOLOGY/PEDIATRICIAN DELIVERY SUMMARY - NSPEDSNEONOTESA_OBGYN_ALL_OB_FT
Peds NP in attendance at delivery as requested for fetal tachycardia. 39.3 wk female born via VAVD on  at 0514 to a 27 y/o  blood type A+ mother. 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 No significant prenatal history. PNL as follows: HIV -, Hep B - RPR NR, Rubella I, GBS - on 8/3. PROM at 0530 on  (@ 24 hours PTD) with clear fluid. Baby emerged blue, limp with weak cry, was brought to warmer, dried, suctioned and stimulated with continued poor color & tone. CPAP +5 initiated 21%, pulse ox ~90s. NICU Fellow called to come assess infant at 10 mol for lack of improvement. APGARS 7/7. Maternal temp @ 20 minutes after delivery 39.5. EOS 9.68. Baby to be admitted to NICU for further management. Family updated.   Mom plans to initiate breastfeeding. Peds NP in attendance at delivery as requested for fetal tachycardia. 39.3 wk female born via VAVD on  at 0514 to a 29 y/o  blood type A+ mother. 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 No significant prenatal history. PNL as follows: HIV -, Hep B - RPR NR, Rubella I, GBS - on 8/3. PROM at 0530 on  (@ 24 hours PTD) with clear fluid. Baby emerged blue, limp with weak cry, was brought to warmer, dried, suctioned and stimulated with continued poor color & tone. CPAP +5 initiated 21%, pulse ox ~90s. NICU Fellow called to come assess infant at 10 mol for lack of improvement. APGARS 7/7. Maternal temp 38 prior to delivery, received Unasyn @ 0339 (<2 hours PTD) temp @ 20 minutes after delivery 39.5. EOS 9.68. Baby to be admitted to NICU for further management. Family updated.   Mom plans to initiate breastfeeding.

## 2023-08-24 NOTE — OB PROVIDER LABOR PROGRESS NOTE - NS_OBIHIFHRDETAILS_OBGYN_ALL_OB_FT
130/mod/+accel/+decel; cat 2
Baseline 120, mod variability, +acc, -dec
Fetal Tach to 170bpm  Moderate variability, (+)small accels, (+)variable decels
fhr: 125bpm, +accels, -decels, mod variability   toco: ctx q2-3min
Baseline 150, mod variability, +acc, -dec

## 2023-08-25 ENCOUNTER — TRANSCRIPTION ENCOUNTER (OUTPATIENT)
Age: 28
End: 2023-08-25

## 2023-08-25 LAB
CULTURE RESULTS: NO GROWTH — SIGNIFICANT CHANGE UP
HCT VFR BLD CALC: 28.4 % — LOW (ref 34.5–45)
HGB BLD-MCNC: 9.2 G/DL — LOW (ref 11.5–15.5)
MCHC RBC-ENTMCNC: 27.3 PG — SIGNIFICANT CHANGE UP (ref 27–34)
MCHC RBC-ENTMCNC: 32.4 GM/DL — SIGNIFICANT CHANGE UP (ref 32–36)
MCV RBC AUTO: 84.3 FL — SIGNIFICANT CHANGE UP (ref 80–100)
NRBC # BLD: 0 /100 WBCS — SIGNIFICANT CHANGE UP (ref 0–0)
PLATELET # BLD AUTO: 241 K/UL — SIGNIFICANT CHANGE UP (ref 150–400)
RBC # BLD: 3.37 M/UL — LOW (ref 3.8–5.2)
RBC # FLD: 14.5 % — SIGNIFICANT CHANGE UP (ref 10.3–14.5)
SPECIMEN SOURCE: SIGNIFICANT CHANGE UP
WBC # BLD: 20.45 K/UL — HIGH (ref 3.8–10.5)
WBC # FLD AUTO: 20.45 K/UL — HIGH (ref 3.8–10.5)

## 2023-08-25 RX ADMIN — Medication 975 MILLIGRAM(S): at 10:17

## 2023-08-25 RX ADMIN — Medication 600 MILLIGRAM(S): at 17:51

## 2023-08-25 RX ADMIN — Medication 600 MILLIGRAM(S): at 06:21

## 2023-08-25 RX ADMIN — Medication 975 MILLIGRAM(S): at 09:47

## 2023-08-25 RX ADMIN — Medication 1 TABLET(S): at 12:27

## 2023-08-25 RX ADMIN — AMPICILLIN SODIUM AND SULBACTAM SODIUM 200 GRAM(S): 250; 125 INJECTION, POWDER, FOR SUSPENSION INTRAMUSCULAR; INTRAVENOUS at 06:51

## 2023-08-25 RX ADMIN — Medication 600 MILLIGRAM(S): at 18:21

## 2023-08-25 RX ADMIN — Medication 975 MILLIGRAM(S): at 22:00

## 2023-08-25 RX ADMIN — Medication 600 MILLIGRAM(S): at 05:21

## 2023-08-25 RX ADMIN — Medication 975 MILLIGRAM(S): at 21:15

## 2023-08-25 RX ADMIN — SODIUM CHLORIDE 3 MILLILITER(S): 9 INJECTION INTRAMUSCULAR; INTRAVENOUS; SUBCUTANEOUS at 05:24

## 2023-08-25 NOTE — DISCHARGE NOTE OB - MATERIALS PROVIDED
[facility] [place] Screening Program/Breastfeeding Log/Bottle Feeding Log/Breastfeeding Mother’s Support Group Information/Guide to Postpartum Care/[facility] Hearing Screen Program/Back To Sleep Handout/Shaken Baby Prevention Handout/Breastfeeding Guide and Packet/Birth Certificate Instructions/Discharge Medication Information for Patients and Families Pocket Guide

## 2023-08-25 NOTE — DISCHARGE NOTE OB - CARE PROVIDERS DIRECT ADDRESSES
Yonathan Arnold), Internal Medicine  [address] [place] [address]  Phone: [phone]  Fax: [phone]    Tyshawn Middleton (JESSICA), [facility]; Internal Medicine  [address] [place] [address]  Phone: [phone]  Fax: [phone]
,DirectAddress_Unknown

## 2023-08-25 NOTE — DISCHARGE NOTE OB - CARE PROVIDER_API CALL
Tayler Mcfarlane  Obstetrics and Gynecology  [address], [address]  [place] [address]  Phone: [phone]  Fax: [phone]  Follow Up Time:

## 2023-08-25 NOTE — DISCHARGE NOTE OB - PATIENT PORTAL LINK FT
You can access the FollowMyHealth Patient Portal offered by [facility] by registering at the following website: [email or web address]. By joining [employer]’s FollowMyHealth portal, you will also be able to view your health information using other applications (apps) compatible with our system.

## 2023-08-25 NOTE — DISCHARGE NOTE OB - CARE PLAN
1 Principal Discharge DX:	Assisted single delivery  Assessment and plan of treatment:	VAVD   Anticipate full recovery

## 2023-08-25 NOTE — DISCHARGE NOTE OB - HOSPITAL COURSE
Patient was admitted to L+D at full term in labour. Pt's labour course complicated by chorioamnionitis and VAVD 2/2 to chorio and cat II tracing. Pt treated with unasyn.  EBL: 450ml  Hct: 35.1  BCx:  UCx:  On Postpartum day 2, patient was discharged home in stable condition, voiding spontaneously, pain well controlled, ambulating, tolerating PO and with normal vital signs. Pt plans to follow up in the [facility] in 6 weeks. Telephone number and clinic information provided prior to discharge.

## 2023-08-25 NOTE — PROGRESS NOTE ADULT - ASSESSMENT
27y/o  PPD#1 s/p VAVD 2/2 cat 2 c/b chorioamnionitis    #postpartum  - EBL 450ml, sp methergine, sp cytotec  - hct: 35.1  - Continue with po analgesia  - Increase ambulation  - Continue regular diet       29y/o  PPD#1 s/p VAVD 2/2 cat 2 c/b chorioamnionitis    #chorioamnionitis  - Afebrile  - s/p unasyn  - monitor vitals     #postpartum  - EBL 450ml, sp methergine, sp cytotec  - hct: 35.1  - Continue with po analgesia  - Increase ambulation  - Continue regular diet       27y/o  PPD#1 s/p VAVD 2/2 cat 2 c/b chorioamnionitis. Vitals stable. Pt doing well.     #chorioamnionitis  - Afebrile  - s/p unasyn  - monitor vitals     #postpartum  - EBL 450ml, sp methergine, sp cytotec  - hct: 35.1  - Continue with po analgesia  - Increase ambulation  - Continue regular diet    Trang Bach PGY1

## 2023-08-26 VITALS
RESPIRATION RATE: 18 BRPM | TEMPERATURE: 98 F | HEART RATE: 72 BPM | OXYGEN SATURATION: 98 % | DIASTOLIC BLOOD PRESSURE: 69 MMHG | SYSTOLIC BLOOD PRESSURE: 107 MMHG

## 2023-08-26 LAB
HCT VFR BLD CALC: 28.9 % — LOW (ref 34.5–45)
HGB BLD-MCNC: 9.1 G/DL — LOW (ref 11.5–15.5)
MCHC RBC-ENTMCNC: 26.8 PG — LOW (ref 27–34)
MCHC RBC-ENTMCNC: 31.5 GM/DL — LOW (ref 32–36)
MCV RBC AUTO: 85.3 FL — SIGNIFICANT CHANGE UP (ref 80–100)
NRBC # BLD: 0 /100 WBCS — SIGNIFICANT CHANGE UP (ref 0–0)
PLATELET # BLD AUTO: 285 K/UL — SIGNIFICANT CHANGE UP (ref 150–400)
RBC # BLD: 3.39 M/UL — LOW (ref 3.8–5.2)
RBC # FLD: 14.4 % — SIGNIFICANT CHANGE UP (ref 10.3–14.5)
WBC # BLD: 11.9 K/UL — HIGH (ref 3.8–10.5)
WBC # FLD AUTO: 11.9 K/UL — HIGH (ref 3.8–10.5)

## 2023-08-26 PROCEDURE — 85027 COMPLETE CBC AUTOMATED: CPT

## 2023-08-26 PROCEDURE — 85025 COMPLETE CBC W/AUTO DIFF WBC: CPT

## 2023-08-26 PROCEDURE — 59050 FETAL MONITOR W/REPORT: CPT

## 2023-08-26 PROCEDURE — 86900 BLOOD TYPING SEROLOGIC ABO: CPT

## 2023-08-26 PROCEDURE — 87040 BLOOD CULTURE FOR BACTERIA: CPT

## 2023-08-26 PROCEDURE — 86850 RBC ANTIBODY SCREEN: CPT

## 2023-08-26 PROCEDURE — 87086 URINE CULTURE/COLONY COUNT: CPT

## 2023-08-26 PROCEDURE — 86901 BLOOD TYPING SEROLOGIC RH(D): CPT

## 2023-08-26 PROCEDURE — 86780 TREPONEMA PALLIDUM: CPT

## 2023-08-26 RX ADMIN — Medication 975 MILLIGRAM(S): at 08:56

## 2023-08-26 RX ADMIN — Medication 975 MILLIGRAM(S): at 08:26

## 2023-08-26 NOTE — PROGRESS NOTE ADULT - ASSESSMENT
A/P: 29yo  PPD#2 s/p VAVD 2/2 cat 2 c/b chorioamnionitis. Patient is stable and doing well post-partum.   - Pain well controlled, continue current pain regimen  - Increase ambulation, SCDs when not ambulating  - Continue regular diet    #chorioamnionitis  - afebrile   - s/p ish Lu  PGY1

## 2023-08-26 NOTE — PROGRESS NOTE ADULT - ATTENDING COMMENTS
Pt doing well today. No acute complaints. Mild fundal tenderness. s/p chorio. Rpt CBC@12p. Potential evening d/c    I have personally seen, examined, and participated in the care of this patient. I have reviewed all pertinent clinical information, including history, physical exam, plan, and the Resident 's note and agree except as noted.     Gomez Howe MD
Briefly, patient is a 27 YO now P1 PPD1 s/p VAVD ( mL) for abnormal fetal heart tracing in the setting of PROM induction of labor complicated by chorioamnionitis. Patient is feeling well and is without complaints. Reports minimal cramping and light bleeding. She is tolerating regular diet, voiding, and ambulating without issue. Denies chest pain, shortness of breath, or lower extremity pain. Patient is afebrile (Tlast 8/24/23 6 am 103.1), non-tachycardic and normotensive. Firm fundus below the umbilicus with minimal lochia. WBC 20, Hb 9.2, Hct 28.4. Patient is breastfeeding. Patient is progressing appropriately postpartum. Anticipate discharge home tomorrow.    Adriana Thibodeaux MD

## 2023-08-26 NOTE — PROGRESS NOTE ADULT - SUBJECTIVE AND OBJECTIVE BOX
Pt is a 29y/o  PPD#1 s/p VAVD 2/2 cat 2 c/b chorioamnionitis. Patient seen and examined at bedside, pain well controlled. Patient is ambulating, voiding spontaneously, passing gas, and tolerating regular diet. Denies CP, SOB, N/V, HA, blurred vision, epigastric pain.    Vital Signs Last 24 Hours  T(C): 36.4 (23 @ 06:55), Max: 37.1 (23 @ 09:00)  HR: 75 (23 @ 06:55) (74 - 86)  BP: 99/66 (23 @ 06:55) (99/66 - 109/71)  RR: 18 (23 @ 06:55) (18 - 18)  SpO2: 97% (23 @ 06:55) (97% - 100%)    Physical Exam:  General: NAD  Abdomen: Soft, non-tender, non-distended, fundus firm  Pelvic: Lochia wnl    Labs:    Blood Type: A Positive  Antibody Screen: Negative  RPR: Negative               11.2   12.79 )-----------( 235      (  @ 13:43 )             35.1         MEDICATIONS  (STANDING):  acetaminophen     Tablet .. 975 milliGRAM(s) Oral <User Schedule>  diphtheria/tetanus/pertussis (acellular) Vaccine (Adacel) 0.5 milliLiter(s) IntraMuscular once  ibuprofen  Tablet. 600 milliGRAM(s) Oral every 6 hours  oxytocin Infusion 41.667 milliUNIT(s)/Min (125 mL/Hr) IV Continuous <Continuous>  oxytocin Infusion. 2 milliUNIT(s)/Min (2 mL/Hr) IV Continuous <Continuous>  prenatal multivitamin 1 Tablet(s) Oral daily  sodium chloride 0.9% lock flush 3 milliLiter(s) IV Push every 8 hours    MEDICATIONS  (PRN):  benzocaine 20%/menthol 0.5% Spray 1 Spray(s) Topical every 6 hours PRN for Perineal discomfort  dibucaine 1% Ointment 1 Application(s) Topical every 6 hours PRN Perineal discomfort  diphenhydrAMINE 25 milliGRAM(s) Oral every 6 hours PRN Pruritus  hydrocortisone 1% Cream 1 Application(s) Topical every 6 hours PRN Moderate Pain (4-6)  lanolin Ointment 1 Application(s) Topical every 6 hours PRN nipple soreness  magnesium hydroxide Suspension 30 milliLiter(s) Oral two times a day PRN Constipation  oxyCODONE    IR 5 milliGRAM(s) Oral every 3 hours PRN Moderate to Severe Pain (4-10)  oxyCODONE    IR 5 milliGRAM(s) Oral once PRN Moderate to Severe Pain (4-10)  pramoxine 1%/zinc 5% Cream 1 Application(s) Topical every 4 hours PRN Moderate Pain (4-6)  simethicone 80 milliGRAM(s) Chew every 4 hours PRN Gas  witch hazel Pads 1 Application(s) Topical every 4 hours PRN Perineal discomfort      
OB Progress Note:  PPD#1    S: 29yo  PPD#2 s/p VAVD. Patient feels well. Pain is well controlled. She is tolerating a regular diet and passing flatus. She is voiding spontaneously, and ambulating without difficulty. Denies CP/SOB. Denies lightheadedness/dizziness. Denies N/V.    O:  Vitals:  Vital Signs Last 24 Hrs  T(C): 36.4 (26 Aug 2023 05:28), Max: 36.8 (25 Aug 2023 17:08)  T(F): 97.6 (26 Aug 2023 05:28), Max: 98.2 (25 Aug 2023 17:08)  HR: 60 (26 Aug 2023 05:) (60 - 91)  BP: 93/58 (26 Aug 2023 05:28) (93/58 - 108/74)  BP(mean): --  RR: 18 (26 Aug 2023 05:) (17 - 18)  SpO2: 98% (26 Aug 2023 05:) (97% - 98%)    Parameters below as of 26 Aug 2023 05:28  Patient On (Oxygen Delivery Method): room air      MEDICATIONS  (STANDING):  acetaminophen     Tablet .. 975 milliGRAM(s) Oral <User Schedule>  diphtheria/tetanus/pertussis (acellular) Vaccine (Adacel) 0.5 milliLiter(s) IntraMuscular once  ibuprofen  Tablet. 600 milliGRAM(s) Oral every 6 hours  oxytocin Infusion 41.667 milliUNIT(s)/Min (125 mL/Hr) IV Continuous <Continuous>  oxytocin Infusion. 2 milliUNIT(s)/Min (2 mL/Hr) IV Continuous <Continuous>  prenatal multivitamin 1 Tablet(s) Oral daily  sodium chloride 0.9% lock flush 3 milliLiter(s) IV Push every 8 hours      Labs:  Blood type: A Positive  Rubella IgG: RPR: Negative                          9.2<L>   20.45<H> >-----------< 241    (  @ 07:06 )             28.4<L>                        11.2<L>   12.79<H> >-----------< 235    (  @ 13:43 )             35.1    Physical Exam:  General: NAD  Abdomen: soft, non-tender, non-distended, fundus firm  Vaginal: Lochia wnl  Extremities: No erythema/edema

## 2023-08-29 LAB
CULTURE RESULTS: SIGNIFICANT CHANGE UP
CULTURE RESULTS: SIGNIFICANT CHANGE UP
SPECIMEN SOURCE: SIGNIFICANT CHANGE UP
SPECIMEN SOURCE: SIGNIFICANT CHANGE UP

## 2024-02-14 PROBLEM — Z78.9 OTHER SPECIFIED HEALTH STATUS: Chronic | Status: ACTIVE | Noted: 2023-08-23

## 2024-02-23 ENCOUNTER — APPOINTMENT (OUTPATIENT)
Dept: INTERNAL MEDICINE | Facility: CLINIC | Age: 29
End: 2024-02-23

## 2024-03-01 ENCOUNTER — APPOINTMENT (OUTPATIENT)
Dept: INTERNAL MEDICINE | Facility: CLINIC | Age: 29
End: 2024-03-01
Payer: MEDICAID

## 2024-03-01 VITALS
BODY MASS INDEX: 21.97 KG/M2 | HEART RATE: 64 BPM | RESPIRATION RATE: 18 BRPM | SYSTOLIC BLOOD PRESSURE: 104 MMHG | TEMPERATURE: 98.2 F | DIASTOLIC BLOOD PRESSURE: 70 MMHG | WEIGHT: 124 LBS | OXYGEN SATURATION: 99 % | HEIGHT: 63 IN

## 2024-03-01 DIAGNOSIS — Z00.00 ENCOUNTER FOR GENERAL ADULT MEDICAL EXAMINATION W/OUT ABNORMAL FINDINGS: ICD-10-CM

## 2024-03-01 PROCEDURE — 93000 ELECTROCARDIOGRAM COMPLETE: CPT

## 2024-03-01 PROCEDURE — 99395 PREV VISIT EST AGE 18-39: CPT

## 2024-03-01 NOTE — HISTORY OF PRESENT ILLNESS
[de-identified] : Patient comes for a physical with overall no changes in exertional functional ability.  Delivered baby girl 6 months ago.  Had some issues with breast-feeding with loss of supply.  Now overall doing well. No acute complaints. Patient had IUD placed after delivery and 1 week later follow-up ultrasound showed IUD had penetrated into the uterine lining and had to be surgically removed.

## 2024-03-01 NOTE — HEALTH RISK ASSESSMENT
[Good] : ~his/her~  mood as  good [Yes] : Yes [Monthly or less (1 pt)] : Monthly or less (1 point) [1 or 2 (0 pts)] : 1 or 2 (0 points) [Never (0 pts)] : Never (0 points) [No] : In the past 12 months have you used drugs other than those required for medical reasons? No [Little interest or pleasure doing things] : 1) Little interest or pleasure doing things [Feeling down, depressed, or hopeless] : 2) Feeling down, depressed, or hopeless [0] : 2) Feeling down, depressed, or hopeless: Not at all (0) [PHQ-2 Negative - No further assessment needed] : PHQ-2 Negative - No further assessment needed [Never] : Never [No falls in past year] : Patient reported no falls in the past year [Audit-CScore] : 1 [[id number]] : 0 [I will adhere to the patient's wishes.] : I will adhere to the patient's wishes.

## 2024-03-02 LAB
ALBUMIN SERPL ELPH-MCNC: 5.2 G/DL
ALP BLD-CCNC: 100 U/L
ALT SERPL-CCNC: 16 U/L
ANION GAP SERPL CALC-SCNC: 13 MMOL/L
AST SERPL-CCNC: 21 U/L
BASOPHILS # BLD AUTO: 0.05 K/UL
BASOPHILS NFR BLD AUTO: 0.6 %
BILIRUB SERPL-MCNC: 0.4 MG/DL
BUN SERPL-MCNC: 11 MG/DL
CALCIUM SERPL-MCNC: 9.9 MG/DL
CHLORIDE SERPL-SCNC: 102 MMOL/L
CO2 SERPL-SCNC: 26 MMOL/L
CREAT SERPL-MCNC: 0.63 MG/DL
EGFR: 123 ML/MIN/1.73M2
EOSINOPHIL # BLD AUTO: 0.23 K/UL
EOSINOPHIL NFR BLD AUTO: 2.8 %
GLUCOSE SERPL-MCNC: 98 MG/DL
HCT VFR BLD CALC: 44.4 %
HGB BLD-MCNC: 14.3 G/DL
IMM GRANULOCYTES NFR BLD AUTO: 0.2 %
IRON SERPL-MCNC: 83 UG/DL
LYMPHOCYTES # BLD AUTO: 1.63 K/UL
LYMPHOCYTES NFR BLD AUTO: 19.8 %
MAN DIFF?: NORMAL
MCHC RBC-ENTMCNC: 29.7 PG
MCHC RBC-ENTMCNC: 32.2 GM/DL
MCV RBC AUTO: 92.3 FL
MONOCYTES # BLD AUTO: 0.54 K/UL
MONOCYTES NFR BLD AUTO: 6.6 %
NEUTROPHILS # BLD AUTO: 5.75 K/UL
NEUTROPHILS NFR BLD AUTO: 70 %
PLATELET # BLD AUTO: 378 K/UL
POTASSIUM SERPL-SCNC: 3.9 MMOL/L
PROT SERPL-MCNC: 7.7 G/DL
RBC # BLD: 4.81 M/UL
RBC # FLD: 13.7 %
SODIUM SERPL-SCNC: 140 MMOL/L
TSH SERPL-ACNC: 1.46 UIU/ML
WBC # FLD AUTO: 8.22 K/UL

## 2024-08-29 ENCOUNTER — EMERGENCY (EMERGENCY)
Facility: HOSPITAL | Age: 29
LOS: 1 days | Discharge: HOME | End: 2024-08-29
Attending: EMERGENCY MEDICINE
Payer: MEDICAID

## 2024-08-29 VITALS
HEIGHT: 63 IN | WEIGHT: 125 LBS | OXYGEN SATURATION: 97 % | HEART RATE: 82 BPM | SYSTOLIC BLOOD PRESSURE: 116 MMHG | RESPIRATION RATE: 18 BRPM | TEMPERATURE: 98 F | DIASTOLIC BLOOD PRESSURE: 81 MMHG

## 2024-08-29 DIAGNOSIS — D24.1 BENIGN NEOPLASM OF RIGHT BREAST: Chronic | ICD-10-CM

## 2024-08-29 PROCEDURE — 99282 EMERGENCY DEPT VISIT SF MDM: CPT

## 2024-08-29 PROCEDURE — 99283 EMERGENCY DEPT VISIT LOW MDM: CPT

## 2024-08-29 RX ORDER — MUPIROCIN 2 %
1 OINTMENT (GRAM) TOPICAL THREE TIMES A DAY
Refills: 0 | Status: DISCONTINUED | OUTPATIENT
Start: 2024-08-29 | End: 2024-09-02

## 2024-08-29 RX ADMIN — Medication 1 APPLICATION(S): at 18:30

## 2024-08-29 NOTE — ED PROVIDER NOTE - ATTENDING APP SHARED VISIT CONTRIBUTION OF CARE
Mehran Bennett MD, Emergency Medicine Attending Physician:     HPI: 29-year-old female who is otherwise healthy who presents with right forearm skin lesion that started 3 days ago, patient noted it while she was traveling in [facility], [place] the [place].  Does not know what caused the lesion, but noted it first as a indentation, which then scabbed over, and had mild drainage from the site.  Up-to-date with vaccinations.    ROS: Denies fevers, chills, trauma, numbness, weakness    Exam: Normal vitals  GEN: In no acute distress, AAOx3  HENT: NCAT, no stridor  EYES: No icterus, PERRLA  RESP: No respiratory distress  CV: No tachycardia, normal perfusion  EXT: (+) 1 cm lesion with central scabbing and faint area of erythema surrounding, no streaking/lymphangitis.  There is no fluctuance, or induration or crepitus or desquamation. No active drainage.  No bony tenderness or deformity. Neurovascularly intact distally with 5/5 strength, normal sensation, equal pulses and brisk capillary refill, soft compartments.  NEURO: Cranial nerves III-XII intact, no pronator drift, normal speech and gait.    MDM: Superficial skin lesion, isolated, atraumatic.  No systemic symptoms.  No evidence of lymphangitic spread.  Possible mild soft tissue infection versus inflammatory response.  Patient with recent diagnosis of yeast infection, and shared decision making had with patient regarding p.o. antibiotics versus topical mupirocin, and patient elected for topical antibiotics.  Wound was demarcated.  Will give patient mupirocin in the ED and instructed how to take.  If worsening, patient to return for systemic antibiotics.    Stable for discharge with close follow-up and strict return precautions. Discussed the indications and side-effects of applicable medications with the topical antibiotic. The patient has been informed of all concerning signs and symptoms to return to Emergency Department, the necessity to follow up with PMD within 4 to 5 days was explained, or to return to the ED if unable to follow-up appropriately, and the patient reports understanding of above with capacity and insight.

## 2024-08-29 NOTE — ED PROVIDER NOTE - NSFOLLOWUPINSTRUCTIONS_ED_ALL_ED_FT
1) Please follow-up with your Primary Medical Doctor in 4-5 days. If you do not have a primary doctor, please call the Physician Referral Service. If you have trouble making an appointment inform the office that you were recently seen in the Emergency Department and it is an urgent matter. Bring a copy of your results with you to your appointment.  2) Return to the Emergency Department for persistent, worsening, or new symptoms, or if you experience fever, chills, spread of the redness, swelling to the site, dizziness, fainting, nausea or vomiting, inability to eat or drink, difficulty with urination, numbness, weakness, or inability to walk safely.   3) Please take the mupirocin antibiotic ointment 3 times a day for the next 7 days.

## 2024-08-29 NOTE — ED ADULT NURSE NOTE - OBJECTIVE STATEMENT
28yo F aaox3 nom past medical history presents to Ed with R forearm skin lesion x 3days, pt recent travel to [facility] , she is not sure how it started

## 2024-08-29 NOTE — ED PROVIDER NOTE - PATIENT PORTAL LINK FT
You can access the FollowMyHealth Patient Portal offered by [facility] by registering at the following website: [email or web address]. By joining [employer]’s FollowMyHealth portal, you will also be able to view your health information using other applications (apps) compatible with our system.

## 2025-03-03 ENCOUNTER — APPOINTMENT (OUTPATIENT)
Dept: INTERNAL MEDICINE | Facility: CLINIC | Age: 30
End: 2025-03-03

## 2025-04-24 NOTE — OB PROVIDER LABOR PROGRESS NOTE - NSVAGINALEXAM_OBGYN_ALL_OB_DT
24-Aug-2023 00:14
23-Aug-2023 18:41
23-Aug-2023 19:46
23-Aug-2023 17:34
24-Aug-2023 04:35
unable to assess/No. TUNDE screening performed.  STOP BANG Legend: 0-2 = LOW Risk; 3-4 = INTERMEDIATE Risk; 5-8 = HIGH Risk